# Patient Record
Sex: FEMALE | Race: WHITE | NOT HISPANIC OR LATINO | Employment: OTHER | URBAN - METROPOLITAN AREA
[De-identification: names, ages, dates, MRNs, and addresses within clinical notes are randomized per-mention and may not be internally consistent; named-entity substitution may affect disease eponyms.]

---

## 2019-02-26 ENCOUNTER — HOSPITAL ENCOUNTER (OUTPATIENT)
Dept: RADIOLOGY | Facility: MEDICAL CENTER | Age: 64
End: 2019-02-26

## 2019-02-27 ENCOUNTER — HOSPITAL ENCOUNTER (OUTPATIENT)
Dept: RADIOLOGY | Facility: MEDICAL CENTER | Age: 64
End: 2019-02-27
Attending: FAMILY MEDICINE
Payer: COMMERCIAL

## 2019-02-27 DIAGNOSIS — Z12.31 VISIT FOR SCREENING MAMMOGRAM: ICD-10-CM

## 2019-02-27 PROCEDURE — 77063 BREAST TOMOSYNTHESIS BI: CPT

## 2019-06-06 ENCOUNTER — TELEPHONE (OUTPATIENT)
Dept: SCHEDULING | Facility: IMAGING CENTER | Age: 64
End: 2019-06-06

## 2020-11-12 ENCOUNTER — HOSPITAL ENCOUNTER (OUTPATIENT)
Dept: RADIOLOGY | Facility: MEDICAL CENTER | Age: 65
End: 2020-11-12
Attending: FAMILY MEDICINE
Payer: COMMERCIAL

## 2020-11-12 DIAGNOSIS — Z12.31 VISIT FOR SCREENING MAMMOGRAM: ICD-10-CM

## 2020-11-12 PROCEDURE — 77067 SCR MAMMO BI INCL CAD: CPT

## 2021-11-24 ENCOUNTER — HOSPITAL ENCOUNTER (OUTPATIENT)
Dept: RADIOLOGY | Facility: MEDICAL CENTER | Age: 66
End: 2021-11-24
Attending: FAMILY MEDICINE
Payer: COMMERCIAL

## 2021-11-24 DIAGNOSIS — Z12.31 ENCOUNTER FOR MAMMOGRAM TO ESTABLISH BASELINE MAMMOGRAM: ICD-10-CM

## 2021-11-24 PROCEDURE — 77063 BREAST TOMOSYNTHESIS BI: CPT

## 2022-01-06 ENCOUNTER — PATIENT MESSAGE (OUTPATIENT)
Dept: HEALTH INFORMATION MANAGEMENT | Facility: OTHER | Age: 67
End: 2022-01-06
Payer: COMMERCIAL

## 2024-02-13 ENCOUNTER — TELEPHONE (OUTPATIENT)
Dept: HEALTH INFORMATION MANAGEMENT | Facility: OTHER | Age: 69
End: 2024-02-13
Payer: COMMERCIAL

## 2024-03-14 ENCOUNTER — TELEPHONE (OUTPATIENT)
Dept: HEALTH INFORMATION MANAGEMENT | Facility: OTHER | Age: 69
End: 2024-03-14
Payer: MEDICARE

## 2024-04-16 ENCOUNTER — OFFICE VISIT (OUTPATIENT)
Dept: SLEEP MEDICINE | Facility: MEDICAL CENTER | Age: 69
End: 2024-04-16
Attending: PREVENTIVE MEDICINE
Payer: MEDICARE

## 2024-04-16 VITALS
RESPIRATION RATE: 16 BRPM | DIASTOLIC BLOOD PRESSURE: 60 MMHG | HEART RATE: 76 BPM | WEIGHT: 196 LBS | BODY MASS INDEX: 33.46 KG/M2 | OXYGEN SATURATION: 95 % | HEIGHT: 64 IN | SYSTOLIC BLOOD PRESSURE: 100 MMHG

## 2024-04-16 DIAGNOSIS — G47.31 COMPLEX SLEEP APNEA SYNDROME: ICD-10-CM

## 2024-04-16 PROCEDURE — 99204 OFFICE O/P NEW MOD 45 MIN: CPT | Performed by: PREVENTIVE MEDICINE

## 2024-04-16 PROCEDURE — 99213 OFFICE O/P EST LOW 20 MIN: CPT | Performed by: PREVENTIVE MEDICINE

## 2024-04-16 PROCEDURE — 3074F SYST BP LT 130 MM HG: CPT | Performed by: PREVENTIVE MEDICINE

## 2024-04-16 PROCEDURE — 3078F DIAST BP <80 MM HG: CPT | Performed by: PREVENTIVE MEDICINE

## 2024-04-16 RX ORDER — TRAZODONE HYDROCHLORIDE 100 MG/1
TABLET ORAL
COMMUNITY

## 2024-04-16 RX ORDER — DESVENLAFAXINE 100 MG/1
TABLET, EXTENDED RELEASE ORAL
COMMUNITY

## 2024-04-16 RX ORDER — OMEPRAZOLE 20 MG/1
20 CAPSULE, DELAYED RELEASE ORAL DAILY
COMMUNITY

## 2024-04-16 NOTE — PROGRESS NOTES
"CHIEF COMPLIANT: \"Establish care.\"  HISTORY OF PRESENT ILLNESS:  Brittney Burns is a 68 y.o. female kindly referred by Sherrill Bey M.D. and  is here for a sleep medicine consultation.     Sleep History:  Brittney uBrns has been tested for sleep apnea. See Below. The patient reports resolution of AILEEN sxs but does wake up once for 1-2 hours.   Was with [facility] but Insurance changed.  She was titrated on ASV on 12/21/2022.  She obtained her machine approximately a year later from [facility].  She recently has switched her insurance to senior care plus.  This is a Medicare program and [facility] does not accept this insurance.  5 weeks ago [facility] demanded that she return the machine and denied her request to purchase it outright.  Fortunately she had her old Remstar machine which she has been using since she returned her ASV machine.  She is requesting a new order for an ASV machine.  Her previous studies have been requested through an [facility].      The patient goes to bed at 9 pm and wakes up at 5 am. It usually takes the patient approximately 30 mins to fall asleep. The patient does feel refreshed after sleeping with PAP and does not  nap during the day.   This pt is a former smoker 1 PPD for 7 years and quit in 1991. Pt does not use cannabis.  This pt does not drink alcoholic beverages and has 1 caffeinated beverages daily.     The patient denies any symptoms of narcolepsy such as sleep paralysis or cataplexy, or any symptoms that suggest parasomnias such as sleep walking or acting out of dreams.    Brittney Burns is a retired      Endorses family members who use PAP therapy/snore:   Father snored    EPWORTH SCORE:     0 /24, this is NOT  consistent with EDS    COMPLIANCE DATA:  > 4 hours at  90 %  Machine type: Aircurve 10 ASV  Date range: 2/17-3/17/2024  AHI: 1.0  TIME USED: 10 hrs. 14 mins.  PRESSURE SETTINGS: Mode ASV auto, min EPAP 5, max EPAP 15, min PS 6, max PS 15 CWP  LEAK: elevated at 38.5  DME: [facility] " but changing to [facility].       OPO    TYPE:  DATE:  Diagnostic:AHI:  Diagnostic  Oxygen Kp: % with mins at or under 88%   TREATMENT AHI:  TREATMENT Oxygen Kp: % with mins at or under 88%   TITRATION:      TYPE:  DATE:  Diagnostic:AHI:  Diagnostic  Oxygen Kp: % with mins at or under 88%   TREATMENT AHI:  TREATMENT Oxygen Kp: % with mins at or under 88%   TITRATION:      ECHO:      Conclusion  1. The left ventricle is normal in size and systolic function. Ejection fraction of 60-65% by Barrera's Biplane. There is grade 1 (impaired relaxation) diastolic function with normal LV filling pressures.  2. No significant valvular abnormalities.  3. Normal right ventricular size, wall thickness and function.    No prior echocardiogram for comparison.  Exam End: 05/19/23 10:21 AM    Specimen Collected: 05/19/23 12:08 PM Last Resulted: 05/19/23 12:08 PM   Received From: [facility]      Significant comorbidities and modifying factors: see below    PAST MEDICAL HISTORY:  Past Medical History:   Diagnosis Date    Alcohol abuse     Asthma     Bipolar affective disorder (HCC)     Dr. Lopez    Cholesterol blood decreased     Depression     Dr. Lopez    Grave's disease     s/p thyroidectomy    Heart burn     Indigestion     Ischemic colitis, enteritis, or enterocolitis (HCC) 4/05    Pain 11/17/14    3/10 right shoulder    SPONDYLOLISTHESIS, GRADE 2       PROBLEM LIST:  Patient Active Problem List    Diagnosis Date Noted    Open fracture of proximal end of humerus 11/25/2014    Closed fracture of right proximal humerus 11/25/2014    Closed fracture of part of upper end of humerus 11/18/2014    Gingivitis 11/08/2014    Dyspepsia 08/01/2013    Spondylolisthesis, grade 2 08/29/2011    Dyslipidemia 05/27/2009    Graves disease     Bipolar affective disorder (HCC)      PAST SOCIAL HISTORY:  Past Surgical History:   Procedure Laterality Date    ORIF, FRACTURE, HUMERUS  11/25/2014    Performed by Modesto Mandujano,  "M.D. at SURGERY [facility] ORS    COLONOSCOPY      APPENDECTOMY  1969    LAMINOTOMY      L5-S1 decomp and fusion    PLASTIC SURGERY      Bilat breast reduction    THYROIDECTOMY TOTAL      TONSILLECTOMY       PAST FAMILY HISTORY:  Family History   Problem Relation Age of Onset    Heart Disease Father         in his 40's    Cancer Father         Raphael's esophagus - early carcinoma    Hyperlipidemia Father     Stroke Father      SOCIAL HISTORY:  Social History     Socioeconomic History    Marital status: Single     Spouse name: Not on file    Number of children: 0    Years of education: IZAIAH    Highest education level: Not on file   Occupational History    Occupation:  - working with      Employer: none   Tobacco Use    Smoking status: Former     Current packs/day: 0.00     Average packs/day: 1 pack/day for 7.0 years (7.0 ttl pk-yrs)     Types: Cigarettes     Start date: 1984     Quit date: 1991     Years since quittin.3    Smokeless tobacco: Never    Tobacco comments:     quit    Substance and Sexual Activity    Alcohol use: No     Comment: quit     Drug use: No    Sexual activity: Yes     Partners: Male     Birth control/protection: Abstinence   Other Topics Concern    Not on file   Social History Narrative    Not on file     Social Determinants of Health     Financial Resource Strain: Not on file   Food Insecurity: Not on file   Transportation Needs: Not on file   Physical Activity: Not on file   Stress: Not on file   Social Connections: Not on file   Intimate Partner Violence: Not on file   Housing Stability: Not on file     ALLERGIES: Sulfa drugs  MEDICATIONS:   \"CURRENT RX\"    REVIEW OF SYSTEMS:  Constitutional: Denies weight loss, endorses chronic daytime fatigue --also see HPI    PHYSICAL EXAM/VITALS:  /60 (BP Location: Left arm, Patient Position: Sitting, BP Cuff Size: Adult)   Pulse 76   Resp 16   Ht 1.626 m (5' 4\")   Wt 88.9 kg (196 lb)   LMP " 08/01/2007   SpO2 95%   BMI 33.64 kg/m²   Neck circumference (inches): 16  Appearance: Well-nourished, well-developed,  looks stated age, no acute distress  Eyes:   EOMI  ENMT:   Hard palate narrow: No   Hard palate high: No   Soft palate/uvula (Mallampati score): 4  Tongue Scalloping: No   Retrognathia:  No   Micrognathia:  No    Neck: Supple, trachea midline  Respiratory effort:  No intercostal retractions or use of accessory muscles  Lung auscultation:  No wheezes rhonchi rubs or rales  Cardiac: No murmurs, rubs, or gallops; regular rhythm, normal rate; no edema  Musculoskeletal:  Grossly normal; gait and station normal  Neurologic:  oriented to person, time, place, and purpose; judgement intact  Psychiatric:  No depression, anxiety, agitation    MEDICAL DECISION MAKING:  The medical record was reviewed as it pertains to this referral. This includes records from primary care,consultants notes, referral request, hospital records, labs and imaging. Any available diagnostic and titration nocturnal polysomnograms, home sleep apnea tests, continuous nocturnal oximetry results, multiple sleep latency tests, and recent compliance reports were reviewed with the patient.    ASSESSMENT/PLAN:  Brittney Burns is a 68 y.o. female  who has a high pretest probability of having obstructive sleep apnea.    [facility] signed and sent for SS results and clinical notes to the following locations:  1. [place], 2. [facility], 3. [facility]   DME will be changed to [facility].  This patient has never had a break in service so once we get the study documentation, we will order a new ASV machine.  Patient is requesting an Airfit N20 mask as this is comfortable for usage.      DIAGNOSES :    1. Complex sleep apnea syndrome    Other orders  - Desvenlafaxine Succinate 100 MG TABLET SR 24 HR; TAKE 1 TABLET ONCE DAILY INTHE MORNING  - omeprazole (PRILOSEC) 20 MG delayed-release capsule; Take 20 mg by mouth every day.  -  traZODone (DESYREL) 100 MG Tab; TAKE 2 TABLETS BY MOUTH EVERY NIGHT    The patient has signs and symptoms consistent with obstructive sleep apnea hypopnea syndrome. Will schedule a nocturnal polysomnogram or a home sleep apnea test (please see plan).  The risks of untreated sleep apnea were discussed with the patient at length. Patients with AILEEN are at increased risk of cardiovascular disease including coronary artery disease, systemic arterial hypertension, pulmonary arterial hypertension, cardiac arrhythmias, and stroke. AILEEN patients have an increased risk of motor vehicle accidents, type 2 diabetes, chronic kidney disease, and non-alcoholic liver disease. The patient was advised to avoid driving a motor vehicle when drowsy.  Have advised the patient to follow up with the appropriate healthcare practitioners for all other medical problems and issues.    RETURN TO CLINIC: Return in about 3 months (around 7/16/2024) for Follow up Visit : General, With Dr Bell.    My total time spent caring for the patient on the day of the encounter was 40 minutes. This includes time spent on a thorough chart review including other physician notes, all sleep studies, as well as critical labs and pulmonary and cardiac studies.  Additionally, it includes a thorough discussion of good sleep hygiene and stimulus control, as well as  the need for consistency in terms of sleep preparation and practice.    Please note that this dictation was created using voice recognition software.  I have made every reasonable attempt to correct obvious errors, I expect that there are errors of grammar and possibly content that I did not discover before finalizing this note.

## 2024-04-19 ENCOUNTER — HOSPITAL ENCOUNTER (OUTPATIENT)
Dept: RADIOLOGY | Facility: MEDICAL CENTER | Age: 69
End: 2024-04-19

## 2024-04-25 ENCOUNTER — HOSPITAL ENCOUNTER (OUTPATIENT)
Dept: RADIOLOGY | Facility: MEDICAL CENTER | Age: 69
End: 2024-04-25
Attending: FAMILY MEDICINE
Payer: MEDICARE

## 2024-04-25 DIAGNOSIS — Z12.31 SCREENING MAMMOGRAM, ENCOUNTER FOR: ICD-10-CM

## 2024-04-25 PROCEDURE — 77067 SCR MAMMO BI INCL CAD: CPT

## 2024-07-17 ENCOUNTER — TELEMEDICINE (OUTPATIENT)
Dept: SLEEP MEDICINE | Facility: MEDICAL CENTER | Age: 69
End: 2024-07-17
Attending: PREVENTIVE MEDICINE
Payer: MEDICARE

## 2024-07-17 VITALS — HEIGHT: 63 IN | WEIGHT: 175 LBS | BODY MASS INDEX: 31.01 KG/M2

## 2024-07-17 DIAGNOSIS — G47.31 COMPLEX SLEEP APNEA SYNDROME: ICD-10-CM

## 2024-07-17 PROCEDURE — 99213 OFFICE O/P EST LOW 20 MIN: CPT | Performed by: PREVENTIVE MEDICINE

## 2024-07-17 PROCEDURE — 99214 OFFICE O/P EST MOD 30 MIN: CPT | Mod: 95 | Performed by: PREVENTIVE MEDICINE

## 2024-07-18 NOTE — PROGRESS NOTES
This evaluation was conducted via Zoom using secure and encrypted video conferencing technology.  The patient was in a private location in the [facility].  The patient's identity was confirmed and verbal consent was obtained for this virtual visit.    CHIEF COMPLAINT: Compliance check/Annual Visit/First Compliance  LAST SEEN: Dr. Bell on 4/16/2024  HISTORY OF PRESENT ILLNESS:  Brittney Burns is a 68 y.o.female who is seen today to follow-up  regarding complex sleep apnea .  The requested studies have arrived    COMPLIANCE DATA greater than 4 hours: N/A  ( telehealth visit.  Data not available)  Machine type:   Date range:  AHI:  TIME USED:  PRESSURE SETTINGS:  LEAK:  DME:  Oxygen Bleed-in?:    This patient is using PAP therapy consistently .      Sleep Study History:    She was titrated on ASV on 12/21/2022. She obtained her machine approximately a year later from [facility]. She recently has switched her insurance to senior care plus. This is a Medicare program and [facility] does not accept this insurance. 5 weeks ago [facility] demanded that she return the machine and denied her request to purchase it outright. Fortunately she had her old Remstar machine which she has been using since she returned her ASV machine. She is requesting a new order for an ASV machine.   She has never had a break in pap therapy.    OPO  Date:  12/21/2015  Analysis duration:  7 hrs. 23 mins.  Oxygen Kp 77%.  Patient spent 181.9 mins at or under 88%      TYPE: Split night PSG with BIPAP  DATE: 2/17/2018  Diagnostic:AHI: 31.0  Diagnostic  Oxygen Kp: 85% with 4.8mins at or under 88%   TREATMENT AHI: 29.8  TREATMENT Oxygen Kp: 83% with 2.1mins at or under 88%   TITRATION: CPAP 5-10, BILEVEL 15/9      TYPE: ASV Titration   DATE: 12/21/2022  TREATMENT AHI:    TREATMENT Oxygen Kp: ***% with ***mins at or under 88%   TITRATION:     Significant comorbidities and modifying factors: see below    PAST MEDICAL HISTORY:  Past Medical History:    Diagnosis Date    Alcohol abuse     Asthma     Bipolar affective disorder (HCC)     Dr. Lopez    Cholesterol blood decreased     Depression     Dr. Lopez    Grave's disease     s/p thyroidectomy    Heart burn     Indigestion     Ischemic colitis, enteritis, or enterocolitis (HCC)     Pain 11/17/14    3/10 right shoulder    SPONDYLOLISTHESIS, GRADE 2       PROBLEM LIST:  Patient Active Problem List    Diagnosis Date Noted    Open fracture of proximal end of humerus 2014    Closed fracture of right proximal humerus 2014    Closed fracture of part of upper end of humerus 2014    Gingivitis 2014    Dyspepsia 2013    Spondylolisthesis, grade 2 2011    Dyslipidemia 2009    Graves disease     Bipolar affective disorder (HCC)      PAST SOCIAL HISTORY:  Past Surgical History:   Procedure Laterality Date    ORIF, FRACTURE, HUMERUS  2014    Performed by Modesto Mandujano M.D. at SURGERY [facility]    COLONOSCOPY      APPENDECTOMY  1969    LAMINOTOMY      L5-S1 decomp and fusion    PLASTIC SURGERY      Bilat breast reduction    THYROIDECTOMY TOTAL      TONSILLECTOMY       PAST FAMILY HISTORY:  Family History   Problem Relation Age of Onset    Heart Disease Father         in his 40's    Cancer Father         Raphael's esophagus - early carcinoma    Hyperlipidemia Father     Stroke Father      SOCIAL HISTORY:  Social History     Socioeconomic History    Marital status: Single     Spouse name: Not on file    Number of children: 0    Years of education: IZAIAH    Highest education level: Not on file   Occupational History    Occupation:  - working with      Employer: none   Tobacco Use    Smoking status: Former     Current packs/day: 0.00     Average packs/day: 1 pack/day for 7.0 years (7.0 ttl pk-yrs)     Types: Cigarettes     Start date: 1984     Quit date: 1991     Years since quittin.5    Smokeless tobacco: Never    Tobacco  comments:     quit 1991   Substance and Sexual Activity    Alcohol use: No     Comment: quit 1987    Drug use: No    Sexual activity: Yes     Partners: Male     Birth control/protection: Abstinence   Other Topics Concern    Not on file   Social History Narrative    Not on file     Social Determinants of Health     Financial Resource Strain: Not on file   Food Insecurity: Not on file   Transportation Needs: Not on file   Physical Activity: Not on file   Stress: Not on file   Social Connections: Not on file   Intimate Partner Violence: Not on file   Housing Stability: Not on file     ALLERGIES: Sulfa drugs  MEDICATIONS:  Current Outpatient Medications   Medication Sig Dispense Refill    Desvenlafaxine Succinate 100 MG TABLET SR 24 HR TAKE 1 TABLET ONCE DAILY INTHE MORNING      omeprazole (PRILOSEC) 20 MG delayed-release capsule Take 20 mg by mouth every day.      traZODone (DESYREL) 100 MG Tab TAKE 2 TABLETS BY MOUTH EVERY NIGHT      levothyroxine (SYNTHROID) 125 MCG TABS Take 1 Tab by mouth Every morning on an empty stomach. 90 Tab 3    rosuvastatin (CRESTOR) 20 MG TABS Take 1 Tab by mouth every day. 30 Tab 4    lamotrigine (LAMICTAL) 200 MG tablet Take 2 Tabs by mouth every bedtime.      Oxycodone-Acetaminophen (PERCOCET-10)  MG TABS Take 1 Tab by mouth every four hours as needed for Moderate Pain or Severe Pain. (Patient not taking: Reported on 4/16/2024) 50 Tab 0    ranitidine (ZANTAC) 300 MG tablet Take 1 Tab by mouth every day. (Patient not taking: Reported on 4/16/2024) 30 Tab 11    cyclobenzaprine (FLEXERIL) 10 MG TABS Take 1 Tab by mouth 3 times a day as needed for Muscle Spasms. (Patient not taking: Reported on 4/16/2024) 90 Tab 1    nystatin (MYCOSTATIN) powder Apply to affected area 2-3 times daily (Patient not taking: Reported on 4/16/2024) 15 g 1    docosahexanoic acid (OMEGA 3 FA) 1000 MG CAPS Take 1,000 mg by mouth 2 Times a Day. (Patient not taking: Reported on 4/16/2024)      Ascorbic Acid  "(VITAMIN C) 1000 MG TABS Take 1 Tab by mouth every morning. (Patient not taking: Reported on 4/16/2024)      multivitamin (THERAGRAN) TABS Take 1 Tab by mouth every morning. (Patient not taking: Reported on 4/16/2024)      diphenhydrAMINE (BENADRYL) 25 MG TABS Take 25 mg by mouth every 6 hours as needed. (Patient not taking: Reported on 4/16/2024)      Doxylamine Succinate, Sleep, 25 MG TABS Take 25 mg by mouth at bedtime as needed. (Patient not taking: Reported on 4/16/2024)      gabapentin (NEURONTIN) 300 MG CAPS Take 600 mg by mouth every evening. (Patient not taking: Reported on 4/16/2024)      ondansetron (ZOFRAN ODT) 4 MG TBDP Take 4 mg by mouth every 8 hours as needed. (Patient not taking: Reported on 4/16/2024)      chlorhexidine (PERIDEX) 0.12 % SOLN RINSE MOUTH WITH 15ml TWICE DAILY***GENERIC FOR PERIDEX* (Patient not taking: Reported on 4/16/2024) 473 mL 1    dextroamphetamine (DEXEDRINE SPANSULE) 10 MG SR capsule Take 10 mg by mouth. One tablet in am and one tablet at 1400 (Patient not taking: Reported on 4/16/2024)      WELLBUTRIN  MG PO TB24 Take 150 mg by mouth every morning. (Patient not taking: Reported on 4/16/2024)      CYMBALTA 60 MG PO CPEP Take 60 mg by mouth every morning. (Patient not taking: Reported on 4/16/2024)       No current facility-administered medications for this visit.    \"CURRENT RX\"    REVIEW OF SYSTEMS:  SEE HPI      PHYSICAL EXAM/VITALS:  Ht 1.6 m (5' 3\")   Wt 79.4 kg (175 lb)   LMP 08/01/2007   BMI 31.00 kg/m²   Appearance: Well-nourished, well-developed,  looks stated age, no acute distress  Eyes:   EOMI  ENMT:  WNL  Neck: Supple, trachea midline  Respiratory effort:  No intercostal retractions or use of accessory muscles  Musculoskeletal:  Grossly normal; gait and station normal  Neurologic:  oriented to person, purpose; judgement intact  Psychiatric:  No depression, anxiety, agitation    MEDICAL DECISION MAKING:  The medical record was reviewed as it pertains to " this referral. This includes records from primary care,consultants notes, referral request, hospital records, labs and imaging. Any available diagnostic and titration nocturnal polysomnograms, home sleep apnea tests, continuous nocturnal oximetry results, multiple sleep latency tests, and recent compliance reports were reviewed with the patient.    ASSESSMENT/PLAN:  Brittney Burns is a 68 y.o.female has continued to use Pap therapy. We have received her SS so that now we can order her a replacement ASV machine.       DIAGNOSES :        1. Complex sleep apnea syndrome  - DME ASV      MEETING INSURANCE COMPLIANCE REQUIREMENTS and MISC tips:    The patient has 90 days in order to be deemed compliant by the insurance company.  The 90-day starts the day that the patient receives the machine and supplies from the DME.  It is during this period of time that the patient must demonstrate a consistent use of pap (greater than 4 hours/day for a minimum of 45 days out of 61.)  The patient is aware that  PAP usage ( time) will be added up--  together over a 24-hour period.  This simply means that the patient does not have to use the machine for 4 hours in a row and the patient does not have to be asleep for the minutes to count towards the required 4 hours.     It is recommended to the patient that the patient begin Pap therapy by first just wearing the mask and headgear loosely applied to the face for 20 minutes a day for the first 2-3 days.  Then  the patient may begin using the mask and headgear with the machine to get a feel for using the mask with a good seal This should be accomplished by using the entire PAP set up with the machine on for 20 to 30 minute(the ramp time) while the patient is awake.  Try this for 2 days.  These usage exercises will  allow the face and brain to get accustomed to mask, headgear and air pressures.  Now the patient can begin to use the Pap therapy for sleep.     Also  the patient is  instructed to keep the machine located slightly ( 6-12 ins)  below the level of the mattress.  This allows for proper humidification of the air before it reaches nasal passages and prevents inhaling water droplets.      Cleaning the mask, headgear, tubing, water reservoir is to be done using hot water and a gentle detergent once a week.        PAP THERAPY  EQUIPMENT AND SUPPLIES SCHEDULE    Mask cushion every month  Nasal pillows 2 times per month  Mask every 3 months  Head gear every 6 months  Tubing every 3 months  Ultra-fine filters 2 times per month  Foam filter every 6 months  Humidifier chamber every 6 months     The risks of untreated sleep apnea were discussed with the patient at length. Patients with AILEEN are at increased risk of cardiovascular disease including coronary artery disease, systemic arterial hypertension, pulmonary arterial hypertension, cardiac arrhythmias, and stroke. AILEEN patients have an increased risk of motor vehicle accidents, type 2 diabetes, chronic kidney disease, and non-alcoholic liver disease. The patient was advised to avoid driving a motor vehicle when drowsy.  Have advised the patient to follow up with the appropriate healthcare practitioners for all other medical problems and issues.    RETURN TO CLINIC: Return in about 3 months (around 10/17/2024) for Compliance check.    My total time spent caring for the patient on the day of the encounter was 40 minutes. This includes time spent on a thorough chart review including other physician notes, all sleep studies, as well as critical labs and pulmonary and cardiac studies.  Additionally, it includes a thorough discussion of good sleep hygiene and stimulus control, as well as  the need for consistency in terms of sleep preparation and practice.    Please note that this dictation was created using voice recognition software.  I have made every reasonable attempt to correct obvious errors, I expect that there are errors of grammar and  possibly content that I did not discover before finalizing this note.

## 2024-10-08 ENCOUNTER — HOSPITAL ENCOUNTER (OUTPATIENT)
Dept: LAB | Facility: MEDICAL CENTER | Age: 69
End: 2024-10-08
Attending: FAMILY MEDICINE
Payer: MEDICARE

## 2024-10-08 LAB
ALBUMIN SERPL BCP-MCNC: 4.9 G/DL (ref 3.2–4.9)
ALBUMIN/GLOB SERPL: 2 G/DL
ALP SERPL-CCNC: 61 U/L (ref 30–99)
ALT SERPL-CCNC: 25 U/L (ref 2–50)
ANION GAP SERPL CALC-SCNC: 15 MMOL/L (ref 7–16)
AST SERPL-CCNC: 20 U/L (ref 12–45)
BASOPHILS # BLD AUTO: 0.2 % (ref 0–1.8)
BASOPHILS # BLD: 0.01 K/UL (ref 0–0.12)
BILIRUB SERPL-MCNC: 0.5 MG/DL (ref 0.1–1.5)
BUN SERPL-MCNC: 24 MG/DL (ref 8–22)
CALCIUM ALBUM COR SERPL-MCNC: 10 MG/DL (ref 8.5–10.5)
CALCIUM SERPL-MCNC: 10.7 MG/DL (ref 8.5–10.5)
CHLORIDE SERPL-SCNC: 103 MMOL/L (ref 96–112)
CHOLEST SERPL-MCNC: 121 MG/DL (ref 100–199)
CO2 SERPL-SCNC: 22 MMOL/L (ref 20–33)
CREAT SERPL-MCNC: 0.85 MG/DL (ref 0.5–1.4)
EOSINOPHIL # BLD AUTO: 0 K/UL (ref 0–0.51)
EOSINOPHIL NFR BLD: 0 % (ref 0–6.9)
ERYTHROCYTE [DISTWIDTH] IN BLOOD BY AUTOMATED COUNT: 43.8 FL (ref 35.9–50)
FASTING STATUS PATIENT QL REPORTED: NORMAL
GFR SERPLBLD CREATININE-BSD FMLA CKD-EPI: 75 ML/MIN/1.73 M 2
GLOBULIN SER CALC-MCNC: 2.4 G/DL (ref 1.9–3.5)
GLUCOSE SERPL-MCNC: 97 MG/DL (ref 65–99)
HCT VFR BLD AUTO: 46.3 % (ref 37–47)
HDLC SERPL-MCNC: 37 MG/DL
HGB BLD-MCNC: 15.3 G/DL (ref 12–16)
IMM GRANULOCYTES # BLD AUTO: 0.01 K/UL (ref 0–0.11)
IMM GRANULOCYTES NFR BLD AUTO: 0.2 % (ref 0–0.9)
LDLC SERPL CALC-MCNC: 64 MG/DL
LYMPHOCYTES # BLD AUTO: 1.45 K/UL (ref 1–4.8)
LYMPHOCYTES NFR BLD: 33.6 % (ref 22–41)
MCH RBC QN AUTO: 29.4 PG (ref 27–33)
MCHC RBC AUTO-ENTMCNC: 33 G/DL (ref 32.2–35.5)
MCV RBC AUTO: 89 FL (ref 81.4–97.8)
MONOCYTES # BLD AUTO: 0.34 K/UL (ref 0–0.85)
MONOCYTES NFR BLD AUTO: 7.9 % (ref 0–13.4)
NEUTROPHILS # BLD AUTO: 2.51 K/UL (ref 1.82–7.42)
NEUTROPHILS NFR BLD: 58.1 % (ref 44–72)
NRBC # BLD AUTO: 0 K/UL
NRBC BLD-RTO: 0 /100 WBC (ref 0–0.2)
PLATELET # BLD AUTO: 289 K/UL (ref 164–446)
PMV BLD AUTO: 9.4 FL (ref 9–12.9)
POTASSIUM SERPL-SCNC: 4 MMOL/L (ref 3.6–5.5)
PROT SERPL-MCNC: 7.3 G/DL (ref 6–8.2)
RBC # BLD AUTO: 5.2 M/UL (ref 4.2–5.4)
SODIUM SERPL-SCNC: 140 MMOL/L (ref 135–145)
TRIGL SERPL-MCNC: 98 MG/DL (ref 0–149)
TSH SERPL-ACNC: 0.86 UIU/ML (ref 0.35–5.5)
WBC # BLD AUTO: 4.3 K/UL (ref 4.8–10.8)

## 2024-10-08 PROCEDURE — 84443 ASSAY THYROID STIM HORMONE: CPT

## 2024-10-08 PROCEDURE — 80053 COMPREHEN METABOLIC PANEL: CPT

## 2024-10-08 PROCEDURE — 85025 COMPLETE CBC W/AUTO DIFF WBC: CPT

## 2024-10-08 PROCEDURE — 80061 LIPID PANEL: CPT

## 2024-10-08 PROCEDURE — 36415 COLL VENOUS BLD VENIPUNCTURE: CPT

## 2024-10-15 ENCOUNTER — HOSPITAL ENCOUNTER (OUTPATIENT)
Dept: LAB | Facility: MEDICAL CENTER | Age: 69
End: 2024-10-15
Attending: FAMILY MEDICINE
Payer: MEDICARE

## 2024-10-15 LAB
25(OH)D3 SERPL-MCNC: 41 NG/ML (ref 30–100)
CA-I SERPL-SCNC: 1.2 MMOL/L (ref 1.1–1.3)
PTH-INTACT SERPL-MCNC: 45.2 PG/ML (ref 14–72)

## 2024-10-15 PROCEDURE — 82306 VITAMIN D 25 HYDROXY: CPT

## 2024-10-15 PROCEDURE — 82330 ASSAY OF CALCIUM: CPT

## 2024-10-15 PROCEDURE — 36415 COLL VENOUS BLD VENIPUNCTURE: CPT

## 2024-10-15 PROCEDURE — 83970 ASSAY OF PARATHORMONE: CPT

## 2024-12-07 ENCOUNTER — HOSPITAL ENCOUNTER (OUTPATIENT)
Dept: RADIOLOGY | Facility: MEDICAL CENTER | Age: 69
End: 2024-12-07
Payer: MEDICARE

## 2024-12-07 ENCOUNTER — OFFICE VISIT (OUTPATIENT)
Dept: URGENT CARE | Facility: CLINIC | Age: 69
End: 2024-12-07
Payer: MEDICARE

## 2024-12-07 VITALS
DIASTOLIC BLOOD PRESSURE: 68 MMHG | RESPIRATION RATE: 16 BRPM | HEART RATE: 83 BPM | TEMPERATURE: 97.5 F | BODY MASS INDEX: 28 KG/M2 | WEIGHT: 158 LBS | SYSTOLIC BLOOD PRESSURE: 110 MMHG | OXYGEN SATURATION: 95 % | HEIGHT: 63 IN

## 2024-12-07 DIAGNOSIS — S16.1XXA STRAIN OF NECK, INITIAL ENCOUNTER: ICD-10-CM

## 2024-12-07 DIAGNOSIS — S09.90XA INJURY OF HEAD, INITIAL ENCOUNTER: ICD-10-CM

## 2024-12-07 PROCEDURE — 3078F DIAST BP <80 MM HG: CPT

## 2024-12-07 PROCEDURE — 70450 CT HEAD/BRAIN W/O DYE: CPT

## 2024-12-07 PROCEDURE — 3074F SYST BP LT 130 MM HG: CPT

## 2024-12-07 PROCEDURE — 99204 OFFICE O/P NEW MOD 45 MIN: CPT

## 2024-12-07 RX ORDER — RISPERIDONE 3 MG/1
3 TABLET ORAL EVERY EVENING
COMMUNITY

## 2024-12-07 RX ORDER — METHYLPREDNISOLONE 4 MG/1
TABLET ORAL
Qty: 21 TABLET | Refills: 0 | Status: SHIPPED | OUTPATIENT
Start: 2024-12-07

## 2024-12-07 RX ORDER — RISPERIDONE 1 MG/1
1 TABLET ORAL EVERY EVENING
COMMUNITY
Start: 2024-11-12

## 2024-12-07 RX ORDER — BUPRENORPHINE 5 UG/H
PATCH TRANSDERMAL
COMMUNITY

## 2024-12-07 RX ORDER — AMOXICILLIN 875 MG/1
TABLET, COATED ORAL
COMMUNITY

## 2024-12-07 RX ORDER — ESTRADIOL 0.1 MG/G
CREAM VAGINAL
COMMUNITY

## 2024-12-07 ASSESSMENT — FIBROSIS 4 INDEX: FIB4 SCORE: 0.93

## 2024-12-07 NOTE — PROGRESS NOTES
Subjective     Brittney Burns is a 69 y.o. female who presents with Fall (Fall from chair, hit upper back and back of head)    HPI:     Brittney is a 70yo female presenting for a head injury that occurred last night. Patient reports she was hanging up drapes and fell backwards off of a chair, striking her head and neck. Denies dizziness. Denies loss of consciousness. No headache. Denies nausea or vomiting. No photophobia or vision changes. Patient is tolerating PO intake. Denies previous injury to head. Facial bones are nontender. Denies AMS, confusion, dysphagia, or abnormal coordination. No extremity or hip pain. No numbness/tingling in extremities. Neck ROM intact. Reports hematoma to temple. No laceration. She is not on chronic anticoagulation.          Review of Systems   Constitutional:  Negative for fever and malaise/fatigue.   HENT:  Negative for nosebleeds.    Eyes:  Negative for blurred vision, double vision, photophobia, pain, discharge and redness.   Respiratory:  Negative for shortness of breath and stridor.    Cardiovascular:  Negative for chest pain.   Gastrointestinal:  Negative for nausea and vomiting.   Musculoskeletal:  Positive for falls and neck pain. Negative for back pain.   Neurological:  Negative for dizziness, tingling, sensory change, speech change, focal weakness, seizures, loss of consciousness, weakness and headaches.     Past Medical History:   Diagnosis Date    Alcohol abuse     Asthma     Bipolar affective disorder ([facility])     Dr. Lopez    Cholesterol blood decreased     Depression     Dr. Lopez    Grave's disease     s/p thyroidectomy    Heart burn     Indigestion     Ischemic colitis, enteritis, or enterocolitis ([facility]) 4/05    Pain 11/17/14    3/10 right shoulder    SPONDYLOLISTHESIS, GRADE 2      Past Surgical History:   Procedure Laterality Date    ORIF, FRACTURE, HUMERUS  11/25/2014    Performed by Modesto Mandujano M.D. at SURGERY [facility]    COLONOSCOPY  4/05     APPENDECTOMY  1969    LAMINOTOMY      L5-S1 decomp and fusion    PLASTIC SURGERY      Bilat breast reduction    THYROIDECTOMY TOTAL      TONSILLECTOMY       Allergies: Pork allergy and Sulfa drugs     Current Outpatient Medications:     risperiDONE (RISPERDAL) 3 MG Tab, Take 3 mg by mouth every evening., Disp: , Rfl:     Tirzepatide (MOUNJARO SC), Inject  under the skin., Disp: , Rfl:     Desvenlafaxine Succinate 100 MG TABLET SR 24 HR, TAKE 1 TABLET ONCE DAILY INTHE MORNING, Disp: , Rfl:     omeprazole (PRILOSEC) 20 MG delayed-release capsule, Take 20 mg by mouth every day., Disp: , Rfl:     traZODone (DESYREL) 100 MG Tab, TAKE 2 TABLETS BY MOUTH EVERY NIGHT, Disp: , Rfl:     levothyroxine (SYNTHROID) 125 MCG TABS, Take 1 Tab by mouth Every morning on an empty stomach., Disp: 90 Tab, Rfl: 3    multivitamin (THERAGRAN) TABS, Take 1 Tablet by mouth every morning., Disp: , Rfl:     rosuvastatin (CRESTOR) 20 MG TABS, Take 1 Tab by mouth every day., Disp: 30 Tab, Rfl: 4    lamotrigine (LAMICTAL) 200 MG tablet, Take 2 Tabs by mouth every bedtime., Disp: , Rfl:     Social History     Tobacco Use    Smoking status: Former     Current packs/day: 0.00     Average packs/day: 1 pack/day for 7.0 years (7.0 ttl pk-yrs)     Types: Cigarettes     Start date: 1984     Quit date: 1991     Years since quittin.9    Smokeless tobacco: Never    Tobacco comments:     quit    Vaping Use    Vaping status: Every Day    Substances: THC   Substance Use Topics    Alcohol use: No     Comment: quit     Drug use: Yes     Types: Inhaled, Marijuana     Family History   Problem Relation Age of Onset    Heart Disease Father         in his 40's    Cancer Father         Raphael's esophagus - early carcinoma    Hyperlipidemia Father     Stroke Father      Medications, Allergies, and current problem list reviewed today in Epic.      Objective     /68 (BP Location: Left arm, Patient Position: Sitting, BP Cuff Size: Adult)    "Pulse 83   Temp 36.4 °C (97.5 °F) (Temporal)   Resp 16   Ht 1.6 m (5' 3\")   Wt 71.7 kg (158 lb)   LMP 08/01/2007   SpO2 95%   BMI 27.99 kg/m²      Physical Exam  Vitals reviewed.   Constitutional:       General: She is not in acute distress.  HENT:      Head: Normocephalic. Contusion present. No raccoon eyes, Harper's sign, abrasion, masses, right periorbital erythema, left periorbital erythema or laceration. Hair is normal.      Jaw: There is normal jaw occlusion. No tenderness, swelling, pain on movement or malocclusion.      Right Ear: Tympanic membrane, ear canal and external ear normal.      Left Ear: Tympanic membrane, ear canal and external ear normal.      Nose: Nose normal. No nasal deformity, septal deviation, signs of injury, nasal tenderness, mucosal edema, congestion or rhinorrhea.      Right Nostril: No epistaxis.      Left Nostril: No epistaxis.      Right Sinus: No maxillary sinus tenderness or frontal sinus tenderness.      Left Sinus: No maxillary sinus tenderness or frontal sinus tenderness.      Mouth/Throat:      Mouth: Mucous membranes are moist.      Comments: Small hematoma to temple. No laceration or active bleeding.   Eyes:      General: Vision grossly intact. Gaze aligned appropriately. No visual field deficit.     Extraocular Movements: Extraocular movements intact.      Right eye: No nystagmus.      Left eye: No nystagmus.      Conjunctiva/sclera: Conjunctivae normal.      Pupils: Pupils are equal, round, and reactive to light.      Right eye: Pupil is round, reactive and not sluggish.      Left eye: Pupil is round, reactive and not sluggish.      Funduscopic exam:     Right eye: Red reflex present.         Left eye: Red reflex present.  Neck:      Trachea: No abnormal tracheal secretions or tracheal deviation.   Cardiovascular:      Rate and Rhythm: Normal rate and regular rhythm.      Pulses: Normal pulses.      Heart sounds: Normal heart sounds.   Pulmonary:      Effort: " Pulmonary effort is normal. No tachypnea, accessory muscle usage, prolonged expiration, respiratory distress or retractions.      Breath sounds: Normal breath sounds. No stridor, decreased air movement or transmitted upper airway sounds. No wheezing, rhonchi or rales.   Musculoskeletal:         General: Tenderness present. No swelling or deformity. Normal range of motion.      Right shoulder: No swelling, deformity, effusion, tenderness, bony tenderness or crepitus. Normal range of motion. Normal strength. Normal pulse.      Left shoulder: No swelling, deformity, effusion, tenderness, bony tenderness or crepitus. Normal range of motion. Normal strength. Normal pulse.      Cervical back: Normal range of motion and neck supple. Tenderness present. No swelling, deformity, erythema, rigidity, bony tenderness or crepitus. Pain with movement and muscular tenderness present. No spinous process tenderness. Normal range of motion.      Thoracic back: No swelling, deformity, spasms, tenderness or bony tenderness. Normal range of motion.      Lumbar back: No swelling, deformity, spasms, tenderness or bony tenderness. Normal range of motion.   Skin:     General: Skin is warm and dry.      Capillary Refill: Capillary refill takes less than 2 seconds.      Findings: No bruising, erythema or rash.   Neurological:      General: No focal deficit present.      Mental Status: She is alert. Mental status is at baseline.      Cranial Nerves: Cranial nerves 2-12 are intact. No dysarthria or facial asymmetry.      Sensory: Sensation is intact.      Motor: Motor function is intact. No weakness or abnormal muscle tone.      Coordination: Coordination is intact. Coordination normal. Finger-Nose-Finger Test normal.      Gait: Gait is intact.      Deep Tendon Reflexes: Reflexes are normal and symmetric.   Psychiatric:         Mood and Affect: Mood normal.         Behavior: Behavior normal.         Thought Content: Thought content normal.        CT-HEAD W/O    Result Date: 12/7/2024 12/7/2024 4:07 PM HISTORY/REASON FOR EXAM:  Head injury with hematoma. S09.90-  Head Injury, unspecified TECHNIQUE/EXAM DESCRIPTION AND NUMBER OF VIEWS: CT of the head without contrast. The study was performed on a helical multidetector CT scanner. Contiguous 2.5 mm axial sections were obtained from the skull base through the vertex. Up to date radiation dose reduction adjustments have been utilized to meet ALARA standards for radiation dose reduction. COMPARISON:  None available FINDINGS: Ventricles, cisterns and cortical sulci are within normal limits. Patchy hypodensities in cerebral white matter most likely represent mild chronic microvascular ischemic type changes. No acute intracranial hemorrhage, major vascular territory infarct, mass effect, midline shift or hydrocephalus. Paranasal sinuses and mastoids are clear. No depressed calvarial fracture.     1.  Chronic microvascular ischemic type changes. 2.  No acute intracranial abnormality.        Assessment & Plan     1. Injury of head, initial encounter   - CT-HEAD W/O; Future    2. Strain of neck, initial encounter   - methylPREDNISolone (MEDROL DOSEPAK) 4 MG Tablet Therapy Pack; Follow schedule on package instructions.  Dispense: 21 Tablet; Refill: 0       MDM/Comments:   Patient presenting for head injury. High mechanism injury fall from chair with positive hematoma on examination, age 69. CT head without acute intracranial abnormality. Will treat neck strain with Medrol dosepak. Do not combine this with additional NSAIDs.   Chronic microvascular ischemic type changes on CT, patient advised to follow up with PCP regarding this finding.   STRICT emergency room precautions discussed. Patient verbalizes understanding of when to present to emergency room or call 911.     No red flag/alarm feature findings present on history or examination. Neurological examination without abnormality.    Discussed with patient signs  and symptoms consistent with a neck strain and concussion.   Treatment of as above and initial rest with no heavy lifting, stooping, or strenuous activity. Massage, ice and/or heat which ever feels better. Encouraged walking, stretching, and range of motion exercises as tolerated. Avoid sitting or laying down for long periods of time except for at night during sleep.   Patient is overall very well-appearing, no acute distress, and normal vital signs. Suspicions for acute emergent pathology are low.   Follow up with your PCP or return to urgent care if symptoms not improving in 1-2 weeks.       Illness progression and alarm symptoms discussed with patient, emphasizing low threshold for returning to clinic/emergency department for worsening symptoms. Patient is agreeable to the plan and verbalizes understanding, and will follow up if warranted.        Differential diagnosis, natural history, supportive care, and indications for immediate follow-up discussed.       Follow-up as needed if symptoms worsen or fail to improve to PCP, Urgent care or Emergency Room.        I personally reviewed prior external notes and test results pertinent to today's visit.  I have independently reviewed and interpreted all diagnostics ordered during this urgent care visit.                              Electronically signed by XIMENA Ramos

## 2024-12-10 ASSESSMENT — ENCOUNTER SYMPTOMS
BLURRED VISION: 0
EYE PAIN: 0
NAUSEA: 0
WEAKNESS: 0
SHORTNESS OF BREATH: 0
FOCAL WEAKNESS: 0
DIZZINESS: 0
FEVER: 0
NECK PAIN: 1
TINGLING: 0
FALLS: 1
DOUBLE VISION: 0
PHOTOPHOBIA: 0
SEIZURES: 0
EYE DISCHARGE: 0
STRIDOR: 0
SPEECH CHANGE: 0
EYE REDNESS: 0
SENSORY CHANGE: 0
VOMITING: 0
BACK PAIN: 0
HEADACHES: 0
LOSS OF CONSCIOUSNESS: 0

## 2024-12-10 ASSESSMENT — VISUAL ACUITY: OU: 1

## 2025-01-30 ENCOUNTER — APPOINTMENT (OUTPATIENT)
Dept: SLEEP MEDICINE | Facility: MEDICAL CENTER | Age: 70
End: 2025-01-30
Attending: STUDENT IN AN ORGANIZED HEALTH CARE EDUCATION/TRAINING PROGRAM
Payer: MEDICARE

## 2025-04-03 ENCOUNTER — OFFICE VISIT (OUTPATIENT)
Dept: SLEEP MEDICINE | Facility: MEDICAL CENTER | Age: 70
End: 2025-04-03
Attending: NURSE PRACTITIONER
Payer: MEDICARE

## 2025-04-03 VITALS
WEIGHT: 142 LBS | RESPIRATION RATE: 16 BRPM | HEIGHT: 63 IN | BODY MASS INDEX: 25.16 KG/M2 | DIASTOLIC BLOOD PRESSURE: 58 MMHG | SYSTOLIC BLOOD PRESSURE: 96 MMHG

## 2025-04-03 DIAGNOSIS — G47.39 COMPLEX SLEEP APNEA SYNDROME: ICD-10-CM

## 2025-04-03 PROCEDURE — 3078F DIAST BP <80 MM HG: CPT | Performed by: NURSE PRACTITIONER

## 2025-04-03 PROCEDURE — 99214 OFFICE O/P EST MOD 30 MIN: CPT | Performed by: NURSE PRACTITIONER

## 2025-04-03 PROCEDURE — 99213 OFFICE O/P EST LOW 20 MIN: CPT | Performed by: NURSE PRACTITIONER

## 2025-04-03 PROCEDURE — 3074F SYST BP LT 130 MM HG: CPT | Performed by: NURSE PRACTITIONER

## 2025-04-03 ASSESSMENT — FIBROSIS 4 INDEX: FIB4 SCORE: 0.93

## 2025-04-03 NOTE — PROGRESS NOTES
Chief Complaint   Patient presents with    Follow-Up     SLEEP - ESTABLISHED PT CHART PREP COMPLETED ON 03/27/2025    Last Office Visit 07/17/2024 with Dr. Bell    1st Compliance: Yes    DME: [facility] #: [phone] // Fax#: [phone]    PAP/O2/OAT: ASV 5/15 ps 6/15    Wireless Data? YES ,set up 01/15/2025, resmed       HPI:  Brittney Burns is a 69 y.o. year old female here today for follow-up on complex/central sleep apnea on ASV.  Last seen 7/17/2024.  Presents for first compliance on new device.  She was titrated on ASV on 12/21/2022. She obtained her machine approximately a year later (2023) from [employer].     Currently using a nasal mask.  Denies any difficulty with mask fit or pressures.  Does find improvement in sleep quality using ASV.  Current compliance shows 100% use with an average time of 8 hours and 4 minutes and a residual AHI of 4.81.  No evidence of mask leak.      TYPE: Split night PSG with BIPAP  DATE: 2/17/2018  Diagnostic:AHI: 31.0  Diagnostic  Oxygen Kp: 85% with 4.8mins at or under 88%   TREATMENT AHI: 29.8  TREATMENT Oxygen Kp: 83% with 2.1mins at or under 88%   TITRATION: CPAP 5-10, BILEVEL 15/9        TYPE: ASV Titration  (see media )  DATE: 12/21/2022  TREATMENT AHI:    TREATMENT Oxygen Kp:XX % with XXmins at or under 88%   TITRATION: ASV    ROS: As per HPI and otherwise negative if not stated.    Past Medical History:   Diagnosis Date    Alcohol abuse     Asthma     Bipolar affective disorder ([facility])     Dr. Lopez    Cholesterol blood decreased     Depression     Dr. Lopez    Grave's disease     s/p thyroidectomy    Heart burn     Indigestion     Ischemic colitis, enteritis, or enterocolitis ([facility]) 4/05    Pain 11/17/14    3/10 right shoulder    SPONDYLOLISTHESIS, GRADE 2        Past Surgical History:   Procedure Laterality Date    ORIF, FRACTURE, HUMERUS  11/25/2014    Performed by Modesto Mandujano M.D. at SURGERY [facility]    COLONOSCOPY  4/05     "APPENDECTOMY  1969    LAMINOTOMY      L5-S1 decomp and fusion    PLASTIC SURGERY      Bilat breast reduction    THYROIDECTOMY TOTAL      TONSILLECTOMY         Family History   Problem Relation Age of Onset    Heart Disease Father         in his 40's    Cancer Father         Raphael's esophagus - early carcinoma    Hyperlipidemia Father     Stroke Father        Allergies as of 04/03/2025 - Reviewed 04/03/2025   Allergen Reaction Noted    Pork allergy  09/07/2022    Sulfa drugs  04/26/2009        Vitals:  BP 96/58 (BP Location: Left arm, Patient Position: Sitting, BP Cuff Size: Adult)   Resp 16   Ht 1.6 m (5' 3\")   Wt 64.4 kg (142 lb)     Current medications as of today           Physical Exam:   Gen:           Alert and oriented, No apparent distress. Mood and affect appropriate, normal interaction with examiner.  Eyes:          PERRL, EOM intact, sclere white, conjunctive moist.  Ears:          Not examined.   Hearing:     Grossly intact.  Nose:          Normal, no lesions or deformities.  Dentition:    Good dentition.  Oropharynx:   Tongue normal, posterior pharynx without erythema or exudate.  Neck:        Supple, trachea midline, no masses.  Respiratory Effort: No intercostal retractions or use of accessory muscles.   Lung Auscultation:      Clear to auscultation bilaterally; no rales, rhonchi or wheezing.  CV:            Regular rate and rhythm. No murmurs, rubs or gallops.  Abd:           Not examined.   Lymphadenopathy: Not examined.  Gait and Station: Normal.  Digits and Nails: No clubbing, cyanosis, petechiae, or nodes.   Cranial Nerves: II-XII grossly intact.  Skin:        No rashes, lesions or ulcers noted.               Ext:           No cyanosis or edema.      Assessment:  1. Complex sleep apnea syndrome  DME Mask and Supplies          Plan:  Using and benefiting from a therapy.  Compliance shows adequate use and control of AILEEN.  Order placed for mask and supplies which will be good for 1 year.  " Advise annual follow-up    Please note that this dictation was created using voice recognition software. I have made every reasonable attempt to correct obvious errors, but it is possible there are errors of grammar and possibly content that I did not discover before finalizing the note.

## 2025-04-11 ENCOUNTER — HOSPITAL ENCOUNTER (OUTPATIENT)
Dept: LAB | Facility: MEDICAL CENTER | Age: 70
End: 2025-04-11
Attending: FAMILY MEDICINE
Payer: MEDICARE

## 2025-04-11 LAB
ALBUMIN SERPL BCP-MCNC: 4.7 G/DL (ref 3.2–4.9)
ALBUMIN/GLOB SERPL: 2 G/DL
ALP SERPL-CCNC: 60 U/L (ref 30–99)
ALT SERPL-CCNC: 22 U/L (ref 2–50)
ANION GAP SERPL CALC-SCNC: 10 MMOL/L (ref 7–16)
AST SERPL-CCNC: 24 U/L (ref 12–45)
BILIRUB SERPL-MCNC: 0.3 MG/DL (ref 0.1–1.5)
BUN SERPL-MCNC: 22 MG/DL (ref 8–22)
CALCIUM ALBUM COR SERPL-MCNC: 9 MG/DL (ref 8.5–10.5)
CALCIUM SERPL-MCNC: 9.6 MG/DL (ref 8.5–10.5)
CHLORIDE SERPL-SCNC: 106 MMOL/L (ref 96–112)
CHOLEST SERPL-MCNC: 117 MG/DL (ref 100–199)
CO2 SERPL-SCNC: 25 MMOL/L (ref 20–33)
CREAT SERPL-MCNC: 0.92 MG/DL (ref 0.5–1.4)
GFR SERPLBLD CREATININE-BSD FMLA CKD-EPI: 68 ML/MIN/1.73 M 2
GLOBULIN SER CALC-MCNC: 2.3 G/DL (ref 1.9–3.5)
GLUCOSE SERPL-MCNC: 86 MG/DL (ref 65–99)
HDLC SERPL-MCNC: 44 MG/DL
LDLC SERPL CALC-MCNC: 60 MG/DL
POTASSIUM SERPL-SCNC: 4.1 MMOL/L (ref 3.6–5.5)
PROT SERPL-MCNC: 7 G/DL (ref 6–8.2)
SODIUM SERPL-SCNC: 141 MMOL/L (ref 135–145)
TRIGL SERPL-MCNC: 67 MG/DL (ref 0–149)
TSH SERPL-ACNC: 1.98 UIU/ML (ref 0.38–5.33)

## 2025-04-11 PROCEDURE — 84443 ASSAY THYROID STIM HORMONE: CPT

## 2025-04-11 PROCEDURE — 80061 LIPID PANEL: CPT

## 2025-04-11 PROCEDURE — 36415 COLL VENOUS BLD VENIPUNCTURE: CPT

## 2025-04-11 PROCEDURE — 80053 COMPREHEN METABOLIC PANEL: CPT

## 2025-05-06 ENCOUNTER — OFFICE VISIT (OUTPATIENT)
Dept: URGENT CARE | Facility: CLINIC | Age: 70
End: 2025-05-06
Payer: MEDICARE

## 2025-05-06 VITALS
DIASTOLIC BLOOD PRESSURE: 76 MMHG | BODY MASS INDEX: 25.16 KG/M2 | OXYGEN SATURATION: 95 % | SYSTOLIC BLOOD PRESSURE: 112 MMHG | HEIGHT: 63 IN | WEIGHT: 142 LBS | RESPIRATION RATE: 20 BRPM | HEART RATE: 82 BPM | TEMPERATURE: 97.5 F

## 2025-05-06 DIAGNOSIS — S61.401A OPEN WOUND OF RIGHT HAND, FOREIGN BODY PRESENCE UNSPECIFIED, UNSPECIFIED WOUND TYPE, INITIAL ENCOUNTER: ICD-10-CM

## 2025-05-06 PROCEDURE — 3074F SYST BP LT 130 MM HG: CPT | Performed by: NURSE PRACTITIONER

## 2025-05-06 PROCEDURE — 3078F DIAST BP <80 MM HG: CPT | Performed by: NURSE PRACTITIONER

## 2025-05-06 PROCEDURE — 99213 OFFICE O/P EST LOW 20 MIN: CPT | Performed by: NURSE PRACTITIONER

## 2025-05-06 ASSESSMENT — ENCOUNTER SYMPTOMS
FEVER: 0
FOCAL WEAKNESS: 0
CHILLS: 0
SENSORY CHANGE: 0
TINGLING: 0

## 2025-05-06 ASSESSMENT — FIBROSIS 4 INDEX: FIB4 SCORE: 1.19

## 2025-05-06 NOTE — PROGRESS NOTES
Subjective     Brittney Burns is a 69 y.o. female who presents with Hand Injury (Reached into trash basket and cut herself with glass , taped fingers together to heal , x 10 days ago )            HPI  New problem.  Patient is a 69-year-old female who presents with right hand injury between her 3rd and 4th fingers.  She apparently put her hand into a trash basket to grab the trash or cut herself with glass approximately 10 days ago.  She sustained a laceration in this area in the webbing between these fingers.  She is concerned that she may have glass in there as it has been slow to heal.  She denies any distal paresthesia, active bleeding.        Pork allergy and Sulfa drugs  Current Outpatient Medications on File Prior to Visit   Medication Sig Dispense Refill    risperiDONE (RISPERDAL) 3 MG Tab Take 3 mg by mouth every evening.      traZODone (DESYREL) 100 MG Tab TAKE 2 TABLETS BY MOUTH EVERY NIGHT      levothyroxine (SYNTHROID) 125 MCG TABS Take 1 Tab by mouth Every morning on an empty stomach. 90 Tab 3    rosuvastatin (CRESTOR) 20 MG TABS Take 1 Tab by mouth every day. 30 Tab 4    lamotrigine (LAMICTAL) 200 MG tablet Take 2 Tabs by mouth every bedtime.      risperiDONE (RISPERDAL) 1 MG Tab Take 1 mg by mouth every evening. (Patient not taking: Reported on 4/3/2025)      tizanidine (ZANAFLEX) 4 MG Tab Take 1 tablet(s) every 8 hours by oral route as needed for 90 days. (Patient not taking: Reported on 4/3/2025)      Tirzepatide (MOUNJARO SC) Inject  under the skin.      methylPREDNISolone (MEDROL DOSEPAK) 4 MG Tablet Therapy Pack Follow schedule on package instructions. (Patient not taking: Reported on 5/6/2025) 21 Tablet 0    Desvenlafaxine Succinate 100 MG TABLET SR 24 HR TAKE 1 TABLET ONCE DAILY INTHE MORNING (Patient not taking: Reported on 4/3/2025)      omeprazole (PRILOSEC) 20 MG delayed-release capsule Take 20 mg by mouth every day.      Oxycodone-Acetaminophen (PERCOCET-10)  MG TABS Take 1 Tab  by mouth every four hours as needed for Moderate Pain or Severe Pain. (Patient not taking: Reported on 4/16/2024) 50 Tab 0    ranitidine (ZANTAC) 300 MG tablet Take 1 Tab by mouth every day. (Patient not taking: Reported on 4/16/2024) 30 Tab 11    cyclobenzaprine (FLEXERIL) 10 MG TABS Take 1 Tab by mouth 3 times a day as needed for Muscle Spasms. (Patient not taking: Reported on 4/3/2025) 90 Tab 1    nystatin (MYCOSTATIN) powder Apply to affected area 2-3 times daily (Patient not taking: Reported on 4/16/2024) 15 g 1    docosahexanoic acid (OMEGA 3 FA) 1000 MG CAPS Take 1,000 mg by mouth 2 Times a Day. (Patient not taking: Reported on 4/16/2024)      multivitamin (THERAGRAN) TABS Take 1 Tablet by mouth every morning. (Patient not taking: Reported on 4/3/2025)      diphenhydrAMINE (BENADRYL) 25 MG TABS Take 25 mg by mouth every 6 hours as needed. (Patient not taking: Reported on 4/16/2024)      Doxylamine Succinate, Sleep, 25 MG TABS Take 25 mg by mouth at bedtime as needed. (Patient not taking: Reported on 4/16/2024)      ondansetron (ZOFRAN ODT) 4 MG TBDP Take 4 mg by mouth every 8 hours as needed. (Patient not taking: Reported on 4/16/2024)      dextroamphetamine (DEXEDRINE SPANSULE) 10 MG SR capsule Take 10 mg by mouth. One tablet in am and one tablet at 1400 (Patient not taking: Reported on 4/16/2024)      WELLBUTRIN  MG PO TB24 Take 150 mg by mouth every morning. (Patient not taking: Reported on 4/16/2024)      CYMBALTA 60 MG PO CPEP Take 60 mg by mouth every morning. (Patient not taking: Reported on 4/16/2024)       No current facility-administered medications on file prior to visit.         Social History     Socioeconomic History    Marital status: Single     Spouse name: Not on file    Number of children: 0    Years of education: IZAIAH    Highest education level: Not on file   Occupational History    Occupation:  - working with      Employer: none   Tobacco Use    Smoking status:  "Former     Current packs/day: 0.00     Average packs/day: 1 pack/day for 7.0 years (7.0 ttl pk-yrs)     Types: Cigarettes     Start date: 1984     Quit date: 1991     Years since quittin.3    Smokeless tobacco: Never    Tobacco comments:     quit    Vaping Use    Vaping status: Every Day    Substances: THC   Substance and Sexual Activity    Alcohol use: No     Comment: quit     Drug use: Yes     Types: Inhaled, Marijuana    Sexual activity: Yes     Partners: Male     Birth control/protection: Abstinence   Other Topics Concern    Not on file   Social History Narrative    Not on file     Social Drivers of Health     Financial Resource Strain: Not on file   Food Insecurity: Not on file   Transportation Needs: Not on file   Physical Activity: Not on file   Stress: Not on file   Social Connections: Not on file   Intimate Partner Violence: Not on file   Housing Stability: Not on file     Breast Cancer-related family history is not on file.      Review of Systems   Constitutional:  Negative for chills and fever.   Skin:         Open wound right hand   Neurological:  Negative for tingling, sensory change and focal weakness.              Objective     /76 (BP Location: Left arm, Patient Position: Sitting, BP Cuff Size: Adult)   Pulse 82   Temp 36.4 °C (97.5 °F) (Temporal)   Resp 20   Ht 1.6 m (5' 3\")   Wt 64.4 kg (142 lb)   LMP 2007   SpO2 95%   BMI 25.15 kg/m²      Physical Exam  Vitals and nursing note reviewed.   Constitutional:       Appearance: Normal appearance.   Cardiovascular:      Rate and Rhythm: Normal rate and regular rhythm.      Heart sounds: No murmur heard.  Pulmonary:      Effort: Pulmonary effort is normal.      Breath sounds: Normal breath sounds.   Musculoskeletal:         General: Normal range of motion.   Skin:     General: Skin is warm and dry.      Comments: Patient with a healing laceration at the webbing between the 3rd and 4th digits of her right hand.  " There is no surrounding erythema, and I do not feel a foreign body.   Neurological:      General: No focal deficit present.      Mental Status: She is alert and oriented to person, place, and time.                                  Assessment & Plan  Open wound of right hand, foreign body presence unspecified, unspecified wound type, initial encounter  Patient advised that this wound may have been deep enough to slow down the healing process.  She is advised that this is a secondary intention healing process that will heal from the inside out.  There are no signs or symptoms of infection at this time and I do not feel a foreign body.  Advised to continue to monitor and follow-up if there are additional concerns.

## 2025-05-14 ENCOUNTER — HOSPITAL ENCOUNTER (OUTPATIENT)
Dept: RADIOLOGY | Facility: MEDICAL CENTER | Age: 70
End: 2025-05-14
Attending: FAMILY MEDICINE
Payer: MEDICARE

## 2025-05-14 DIAGNOSIS — N95.9 MENOPAUSAL PROBLEM: ICD-10-CM

## 2025-05-14 DIAGNOSIS — Z12.31 VISIT FOR SCREENING MAMMOGRAM: ICD-10-CM

## 2025-05-14 PROCEDURE — 77067 SCR MAMMO BI INCL CAD: CPT

## 2025-05-14 PROCEDURE — 77080 DXA BONE DENSITY AXIAL: CPT
